# Patient Record
Sex: FEMALE | Race: ASIAN | NOT HISPANIC OR LATINO | URBAN - METROPOLITAN AREA
[De-identification: names, ages, dates, MRNs, and addresses within clinical notes are randomized per-mention and may not be internally consistent; named-entity substitution may affect disease eponyms.]

---

## 2018-02-12 ENCOUNTER — EMERGENCY (EMERGENCY)
Facility: HOSPITAL | Age: 43
LOS: 1 days | Discharge: HOME | End: 2018-02-12
Attending: EMERGENCY MEDICINE | Admitting: EMERGENCY MEDICINE
Payer: MEDICAID

## 2018-02-12 VITALS
SYSTOLIC BLOOD PRESSURE: 129 MMHG | RESPIRATION RATE: 17 BRPM | HEART RATE: 79 BPM | OXYGEN SATURATION: 100 % | TEMPERATURE: 98 F | DIASTOLIC BLOOD PRESSURE: 67 MMHG

## 2018-02-12 LAB
ALBUMIN SERPL ELPH-MCNC: 4 G/DL — SIGNIFICANT CHANGE UP (ref 3.3–5)
ALP SERPL-CCNC: 82 U/L — SIGNIFICANT CHANGE UP (ref 40–120)
ALT FLD-CCNC: 17 U/L — SIGNIFICANT CHANGE UP (ref 4–33)
AST SERPL-CCNC: 21 U/L — SIGNIFICANT CHANGE UP (ref 4–32)
BASOPHILS # BLD AUTO: 0.04 K/UL — SIGNIFICANT CHANGE UP (ref 0–0.2)
BASOPHILS NFR BLD AUTO: 0.7 % — SIGNIFICANT CHANGE UP (ref 0–2)
BILIRUB SERPL-MCNC: 0.2 MG/DL — SIGNIFICANT CHANGE UP (ref 0.2–1.2)
BUN SERPL-MCNC: 11 MG/DL — SIGNIFICANT CHANGE UP (ref 7–23)
CALCIUM SERPL-MCNC: 8.3 MG/DL — LOW (ref 8.4–10.5)
CHLORIDE SERPL-SCNC: 101 MMOL/L — SIGNIFICANT CHANGE UP (ref 98–107)
CO2 SERPL-SCNC: 26 MMOL/L — SIGNIFICANT CHANGE UP (ref 22–31)
CREAT SERPL-MCNC: 0.74 MG/DL — SIGNIFICANT CHANGE UP (ref 0.5–1.3)
EOSINOPHIL # BLD AUTO: 0.03 K/UL — SIGNIFICANT CHANGE UP (ref 0–0.5)
EOSINOPHIL NFR BLD AUTO: 0.5 % — SIGNIFICANT CHANGE UP (ref 0–6)
GLUCOSE SERPL-MCNC: 95 MG/DL — SIGNIFICANT CHANGE UP (ref 70–99)
HCT VFR BLD CALC: 32.4 % — LOW (ref 34.5–45)
HGB BLD-MCNC: 9.3 G/DL — LOW (ref 11.5–15.5)
IMM GRANULOCYTES # BLD AUTO: 0.02 # — SIGNIFICANT CHANGE UP
IMM GRANULOCYTES NFR BLD AUTO: 0.3 % — SIGNIFICANT CHANGE UP (ref 0–1.5)
LYMPHOCYTES # BLD AUTO: 1.79 K/UL — SIGNIFICANT CHANGE UP (ref 1–3.3)
LYMPHOCYTES # BLD AUTO: 30.1 % — SIGNIFICANT CHANGE UP (ref 13–44)
MCHC RBC-ENTMCNC: 20.4 PG — LOW (ref 27–34)
MCHC RBC-ENTMCNC: 28.7 % — LOW (ref 32–36)
MCV RBC AUTO: 71.2 FL — LOW (ref 80–100)
MONOCYTES # BLD AUTO: 0.43 K/UL — SIGNIFICANT CHANGE UP (ref 0–0.9)
MONOCYTES NFR BLD AUTO: 7.2 % — SIGNIFICANT CHANGE UP (ref 2–14)
NEUTROPHILS # BLD AUTO: 3.63 K/UL — SIGNIFICANT CHANGE UP (ref 1.8–7.4)
NEUTROPHILS NFR BLD AUTO: 61.2 % — SIGNIFICANT CHANGE UP (ref 43–77)
NRBC # FLD: 0 — SIGNIFICANT CHANGE UP
PLATELET # BLD AUTO: 273 K/UL — SIGNIFICANT CHANGE UP (ref 150–400)
PMV BLD: 11.5 FL — SIGNIFICANT CHANGE UP (ref 7–13)
POTASSIUM SERPL-MCNC: 4.4 MMOL/L — SIGNIFICANT CHANGE UP (ref 3.5–5.3)
POTASSIUM SERPL-SCNC: 4.4 MMOL/L — SIGNIFICANT CHANGE UP (ref 3.5–5.3)
PROT SERPL-MCNC: 8 G/DL — SIGNIFICANT CHANGE UP (ref 6–8.3)
RBC # BLD: 4.55 M/UL — SIGNIFICANT CHANGE UP (ref 3.8–5.2)
RBC # FLD: 15.1 % — HIGH (ref 10.3–14.5)
SODIUM SERPL-SCNC: 139 MMOL/L — SIGNIFICANT CHANGE UP (ref 135–145)
TSH SERPL-MCNC: 1.56 UIU/ML — SIGNIFICANT CHANGE UP (ref 0.27–4.2)
WBC # BLD: 5.94 K/UL — SIGNIFICANT CHANGE UP (ref 3.8–10.5)
WBC # FLD AUTO: 5.94 K/UL — SIGNIFICANT CHANGE UP (ref 3.8–10.5)

## 2018-02-12 PROCEDURE — 99284 EMERGENCY DEPT VISIT MOD MDM: CPT

## 2018-02-12 NOTE — ED PROVIDER NOTE - PROGRESS NOTE DETAILS
ELIAZAR Johnson:  b/l tinnitus for 6 months, intermittent cold sx.  labs pending, if ok will dc home with ENT fu. ELIAZAR Johnson:  Labs okay , found to have asymptomatic anemia.   denies bloody/dark stools, Appears to be iron deficient from previous visits, will restart her iron and set up a follow up to be seen by our IM clinic. Number provided. In addition will set up a follow up to be seen by ENT clinic, number provided as dw Dr rodriguez.

## 2018-02-12 NOTE — ED PROVIDER NOTE - CARE PLAN
Principal Discharge DX:	Tinnitus  Assessment and plan of treatment:	Follow up with your primary doctor for a post hospital visit within 48 hours, taking all results to be reviewed from the ER. Set up a follow up to be seen by an ENT for your symptoms, , taking all results from the ER to be reviewed. You can call our clinic ro set up an apponitment or use one of the physiciains on the sheet we provided. If any ear pain, fevers, cihlls, worsening, coenring or new signs or spyomts return to the ER Principal Discharge DX:	Tinnitus  Assessment and plan of treatment:	Follow up with our internal medicine clinic for a  post hospital visit within, taking all results to be reviewed from the ER. Set up a follow up to be seen by an ENT for your symptoms as well, , taking all results from the ER to be reviewed. You can call our clinic to set up an appointment for both appointments.  If f any ear pain, dizziness, headaches, fevers, chills, worsening, concerning  or new signs or symptoms return to the ER

## 2018-02-12 NOTE — ED PROVIDER NOTE - MEDICAL DECISION MAKING DETAILS
43 y/o F w/ chronic b/l tinnitus. No new medications or supplements. Subjective hearing loss. No ear pain. Nonfocal neuro exam. Plan: basic labs and ENT follow up.

## 2018-02-12 NOTE — ED PROVIDER NOTE - CHPI ED SYMPTOMS NEG
no chills/no ear pain, no HA, no dizziness, no sore throat, no congestion/no fever/no nausea/no vomiting

## 2018-02-12 NOTE — ED PROVIDER NOTE - OBJECTIVE STATEMENT
43 y/o F w/ PMHx of anemia, presents to the ED c/o constant b/l tinnitus x6 months w/ associated subjective decreased hearing. Pt describes sound in b/l ears as "shhhhh". Pt also reports intermittent cold sx, but currently asymptomatic. Endorses use of NyQuil w/ no relief. No use of Aspirin or herbal medications. Pt does not have PMD. Denies ear pain, fever, chills, rhinorrhea, congestion, sore throat, HA, dizziness, nausea, vomiting or any other complaints. NKDA.

## 2018-02-12 NOTE — ED PROVIDER NOTE - PLAN OF CARE
Follow up with your primary doctor for a post hospital visit within 48 hours, taking all results to be reviewed from the ER. Set up a follow up to be seen by an ENT for your symptoms, , taking all results from the ER to be reviewed. You can call our clinic ro set up an apponitment or use one of the physiciains on the sheet we provided. If any ear pain, fevers, cihlls, worsening, coenring or new signs or spyomts return to the ER Follow up with our internal medicine clinic for a  post hospital visit within, taking all results to be reviewed from the ER. Set up a follow up to be seen by an ENT for your symptoms as well, , taking all results from the ER to be reviewed. You can call our clinic to set up an appointment for both appointments.  If f any ear pain, dizziness, headaches, fevers, chills, worsening, concerning  or new signs or symptoms return to the ER

## 2018-02-12 NOTE — ED PROVIDER NOTE - ATTENDING CONTRIBUTION TO CARE
I performed the initial face to face bedside interview with this patient regarding history of present illness, review of symptoms and past medical, social and family history.  I completed an independent physical examination.  I was the initial provider who evaluated this patient.  The history, review of symptoms and examination was documented by the scribe in my presence and I attest to the accuracy of the documentation.  I have signed out the follow up of any pending tests (i.e. labs, radiological studies) to the PA.  I have discussed the patient’s plan of care and disposition with the PA. DHIRAJ Reynoso MD

## 2018-03-12 ENCOUNTER — EMERGENCY (EMERGENCY)
Facility: HOSPITAL | Age: 43
LOS: 1 days | Discharge: HOME | End: 2018-03-12
Attending: EMERGENCY MEDICINE | Admitting: EMERGENCY MEDICINE
Payer: MEDICAID

## 2018-03-12 VITALS
DIASTOLIC BLOOD PRESSURE: 92 MMHG | HEART RATE: 87 BPM | OXYGEN SATURATION: 100 % | RESPIRATION RATE: 16 BRPM | TEMPERATURE: 98 F | SYSTOLIC BLOOD PRESSURE: 132 MMHG

## 2018-03-12 VITALS
TEMPERATURE: 98 F | OXYGEN SATURATION: 100 % | HEART RATE: 74 BPM | DIASTOLIC BLOOD PRESSURE: 84 MMHG | SYSTOLIC BLOOD PRESSURE: 125 MMHG | RESPIRATION RATE: 18 BRPM

## 2018-03-12 LAB
ALBUMIN SERPL ELPH-MCNC: 3.9 G/DL — SIGNIFICANT CHANGE UP (ref 3.3–5)
ALP SERPL-CCNC: 86 U/L — SIGNIFICANT CHANGE UP (ref 40–120)
ALT FLD-CCNC: 21 U/L — SIGNIFICANT CHANGE UP (ref 4–33)
AST SERPL-CCNC: 21 U/L — SIGNIFICANT CHANGE UP (ref 4–32)
BASE EXCESS BLDV CALC-SCNC: 1.7 MMOL/L — SIGNIFICANT CHANGE UP
BASOPHILS # BLD AUTO: 0.02 K/UL — SIGNIFICANT CHANGE UP (ref 0–0.2)
BASOPHILS NFR BLD AUTO: 0.3 % — SIGNIFICANT CHANGE UP (ref 0–2)
BASOPHILS NFR SPEC: 1 % — SIGNIFICANT CHANGE UP (ref 0–2)
BILIRUB SERPL-MCNC: < 0.2 MG/DL — LOW (ref 0.2–1.2)
BLASTS # FLD: 0 % — SIGNIFICANT CHANGE UP (ref 0–0)
BUN SERPL-MCNC: 12 MG/DL — SIGNIFICANT CHANGE UP (ref 7–23)
CALCIUM SERPL-MCNC: 8.2 MG/DL — LOW (ref 8.4–10.5)
CHLORIDE SERPL-SCNC: 102 MMOL/L — SIGNIFICANT CHANGE UP (ref 98–107)
CO2 SERPL-SCNC: 25 MMOL/L — SIGNIFICANT CHANGE UP (ref 22–31)
CREAT SERPL-MCNC: 0.78 MG/DL — SIGNIFICANT CHANGE UP (ref 0.5–1.3)
EOSINOPHIL # BLD AUTO: 0.03 K/UL — SIGNIFICANT CHANGE UP (ref 0–0.5)
EOSINOPHIL NFR BLD AUTO: 0.5 % — SIGNIFICANT CHANGE UP (ref 0–6)
EOSINOPHIL NFR FLD: 0 % — SIGNIFICANT CHANGE UP (ref 0–6)
GAS PNL BLDV: 136 MMOL/L — SIGNIFICANT CHANGE UP (ref 136–146)
GIANT PLATELETS BLD QL SMEAR: PRESENT — SIGNIFICANT CHANGE UP
GLUCOSE BLDV-MCNC: 94 — SIGNIFICANT CHANGE UP (ref 70–99)
GLUCOSE SERPL-MCNC: 93 MG/DL — SIGNIFICANT CHANGE UP (ref 70–99)
HCO3 BLDV-SCNC: 25 MMOL/L — SIGNIFICANT CHANGE UP (ref 20–27)
HCT VFR BLD CALC: 30.2 % — LOW (ref 34.5–45)
HCT VFR BLDV CALC: 28.4 % — LOW (ref 34.5–45)
HGB BLD-MCNC: 8.8 G/DL — LOW (ref 11.5–15.5)
HGB BLDV-MCNC: 9.2 G/DL — LOW (ref 11.5–15.5)
HYPOCHROMIA BLD QL: SIGNIFICANT CHANGE UP
IMM GRANULOCYTES # BLD AUTO: 0.02 # — SIGNIFICANT CHANGE UP
IMM GRANULOCYTES NFR BLD AUTO: 0.3 % — SIGNIFICANT CHANGE UP (ref 0–1.5)
LIDOCAIN IGE QN: 32.6 U/L — SIGNIFICANT CHANGE UP (ref 7–60)
LYMPHOCYTES # BLD AUTO: 1.74 K/UL — SIGNIFICANT CHANGE UP (ref 1–3.3)
LYMPHOCYTES # BLD AUTO: 30 % — SIGNIFICANT CHANGE UP (ref 13–44)
LYMPHOCYTES NFR SPEC AUTO: 24.5 % — SIGNIFICANT CHANGE UP (ref 13–44)
MACROCYTES BLD QL: SLIGHT — SIGNIFICANT CHANGE UP
MCHC RBC-ENTMCNC: 20.2 PG — LOW (ref 27–34)
MCHC RBC-ENTMCNC: 29.1 % — LOW (ref 32–36)
MCV RBC AUTO: 69.4 FL — LOW (ref 80–100)
METAMYELOCYTES # FLD: 0 % — SIGNIFICANT CHANGE UP (ref 0–1)
MICROCYTES BLD QL: SIGNIFICANT CHANGE UP
MONOCYTES # BLD AUTO: 0.42 K/UL — SIGNIFICANT CHANGE UP (ref 0–0.9)
MONOCYTES NFR BLD AUTO: 7.2 % — SIGNIFICANT CHANGE UP (ref 2–14)
MONOCYTES NFR BLD: 1 % — LOW (ref 2–9)
MYELOCYTES NFR BLD: 0 % — SIGNIFICANT CHANGE UP (ref 0–0)
NEUTROPHIL AB SER-ACNC: 63.2 % — SIGNIFICANT CHANGE UP (ref 43–77)
NEUTROPHILS # BLD AUTO: 3.57 K/UL — SIGNIFICANT CHANGE UP (ref 1.8–7.4)
NEUTROPHILS NFR BLD AUTO: 61.7 % — SIGNIFICANT CHANGE UP (ref 43–77)
NEUTS BAND # BLD: 0 % — SIGNIFICANT CHANGE UP (ref 0–6)
NRBC # FLD: 0 — SIGNIFICANT CHANGE UP
OTHER - HEMATOLOGY %: 0 — SIGNIFICANT CHANGE UP
OVALOCYTES BLD QL SMEAR: SLIGHT — SIGNIFICANT CHANGE UP
PCO2 BLDV: 52 MMHG — HIGH (ref 41–51)
PH BLDV: 7.33 PH — SIGNIFICANT CHANGE UP (ref 7.32–7.43)
PLATELET # BLD AUTO: 253 K/UL — SIGNIFICANT CHANGE UP (ref 150–400)
PLATELET COUNT - ESTIMATE: NORMAL — SIGNIFICANT CHANGE UP
PMV BLD: 10.8 FL — SIGNIFICANT CHANGE UP (ref 7–13)
PO2 BLDV: 25 MMHG — LOW (ref 35–40)
POTASSIUM BLDV-SCNC: 4 MMOL/L — SIGNIFICANT CHANGE UP (ref 3.4–4.5)
POTASSIUM SERPL-MCNC: 4.3 MMOL/L — SIGNIFICANT CHANGE UP (ref 3.5–5.3)
POTASSIUM SERPL-SCNC: 4.3 MMOL/L — SIGNIFICANT CHANGE UP (ref 3.5–5.3)
PROMYELOCYTES # FLD: 0 % — SIGNIFICANT CHANGE UP (ref 0–0)
PROT SERPL-MCNC: 8 G/DL — SIGNIFICANT CHANGE UP (ref 6–8.3)
RBC # BLD: 4.35 M/UL — SIGNIFICANT CHANGE UP (ref 3.8–5.2)
RBC # FLD: 15.2 % — HIGH (ref 10.3–14.5)
REVIEW TO FOLLOW: YES — SIGNIFICANT CHANGE UP
SAO2 % BLDV: 30.3 % — LOW (ref 60–85)
SMUDGE CELLS # BLD: PRESENT — SIGNIFICANT CHANGE UP
SODIUM SERPL-SCNC: 138 MMOL/L — SIGNIFICANT CHANGE UP (ref 135–145)
VARIANT LYMPHS # BLD: 9.4 % — SIGNIFICANT CHANGE UP
WBC # BLD: 5.8 K/UL — SIGNIFICANT CHANGE UP (ref 3.8–10.5)
WBC # FLD AUTO: 5.8 K/UL — SIGNIFICANT CHANGE UP (ref 3.8–10.5)

## 2018-03-12 PROCEDURE — 99283 EMERGENCY DEPT VISIT LOW MDM: CPT

## 2018-03-12 RX ORDER — LIDOCAINE 4 G/100G
10 CREAM TOPICAL ONCE
Qty: 0 | Refills: 0 | Status: COMPLETED | OUTPATIENT
Start: 2018-03-12 | End: 2018-03-12

## 2018-03-12 RX ORDER — FAMOTIDINE 10 MG/ML
20 INJECTION INTRAVENOUS ONCE
Qty: 0 | Refills: 0 | Status: COMPLETED | OUTPATIENT
Start: 2018-03-12 | End: 2018-03-12

## 2018-03-12 RX ADMIN — LIDOCAINE 10 MILLILITER(S): 4 CREAM TOPICAL at 11:19

## 2018-03-12 RX ADMIN — FAMOTIDINE 20 MILLIGRAM(S): 10 INJECTION INTRAVENOUS at 11:19

## 2018-03-12 RX ADMIN — Medication 30 MILLILITER(S): at 11:19

## 2018-03-12 NOTE — ED PROVIDER NOTE - CARE PLAN
Principal Discharge DX:	Gastritis  Assessment and plan of treatment:	1) Your labs were unremarkable except for you had low red blood cell counts. Please take Pepcid for pain control and follow up with the gastroenterologist. You must also follow up with your primary care doctor within the next 3 days for your low red blood cell count.  Please call today or tomorrow for an appointment.  If you cannot follow-up with your doctor(s), please return to the ED for any urgent issues.  2) If you have any worsening of symptoms or any other concerns please return to the ED immediately.  3) Please continue taking your home medications as directed.  4) You may have been given a copy of your labs and/or imaging.  Please go over these with your primary care doctor.

## 2018-03-12 NOTE — ED PROVIDER NOTE - NS ED ROS FT
Constitutional: no fevers, chills  HEENT: no visual changes, no sore throat, no rhinorrhea  CV: no cp  Resp: no sob  GI: + abd pain, no n/v, diarrhea/constipation  : no dysuria, hematuria  MSK: no joint pains  skin: no rashes  neuro: no HA, no confusion

## 2018-03-12 NOTE — ED PROVIDER NOTE - MEDICAL DECISION MAKING DETAILS
42y F, h/o anemia, p/w generalized abd pain x1wk. Abd exam benign. No concern for surgical pathology such as SBO. Possible gastritis v PUD. Will check labs, maalox, pepcid, viscous lido, reassess. No indications for imaging at this moment.

## 2018-03-12 NOTE — ED PROVIDER NOTE - PHYSICAL EXAMINATION
Vitals: WNL  Gen: laying comfortably in NAD  Head: NCAT  ENT: sclerae white, anicterus, moist mucous membranes. No exudates. Neck supple, no LAD,  no carotid bruits, no JVD  CV: RRR. Audible S1 and S2. No murmurs, rubs, gallops, S3, nor S4, 2+ radial and DP pulses   Pulm: Clear to auscultation bilaterally. No wheezes, rales, or rhonchi  Abd: soft, normoactive BS x4, mildly tender epigastric region, no rebound, no guarding, no rashes  Musculoskeletal:  No peripheral edema  Skin: no lesions or scars noted  Neurologic: AAOx3  : no CVA tenderness

## 2018-03-12 NOTE — ED PROVIDER NOTE - OBJECTIVE STATEMENT
42y F, h/o anemia, p/w generalized abd pain x1wk. Pt also complaining of b/l breast pain. Reports pain is constant, unable to characterize type of pain, is not associated with food. Nothing makes it better or worse. Denies n/v, f/c, hematochezia, melena, vaginal discharge, abd distention. Only abd surgery was csection 12y ago. Still able to tolerate PO, has normal BM. Tried taking an herbal tea which dind't help. Last menstraul period 2wks ago. Denies redness, swelling, discharge from breasts.

## 2018-03-12 NOTE — ED PROVIDER NOTE - PROGRESS NOTE DETAILS
Minna Arguelles MD PGY1: pt improved with gi cocktail. pt with h/o anemia on iron - labs show hgb 8.8. pt without signs of bleeding, end organ damage from anemia. will d/c home with gi f/u for gastritis.

## 2018-03-12 NOTE — ED PROVIDER NOTE - PLAN OF CARE
1) Your labs were unremarkable except for you had low red blood cell counts. Please take Pepcid for pain control and follow up with the gastroenterologist. You must also follow up with your primary care doctor within the next 3 days for your low red blood cell count.  Please call today or tomorrow for an appointment.  If you cannot follow-up with your doctor(s), please return to the ED for any urgent issues.  2) If you have any worsening of symptoms or any other concerns please return to the ED immediately.  3) Please continue taking your home medications as directed.  4) You may have been given a copy of your labs and/or imaging.  Please go over these with your primary care doctor.

## 2018-03-12 NOTE — ED PROVIDER NOTE - ATTENDING CONTRIBUTION TO CARE
MD Plaza:  I performed a face to face bedside interview with patient regarding history of present illness, review of symptoms and past medical history. I completed an independent physical exam(documented below).  I have discussed patient's plan of care with resident.   I agree with note as stated above, having amended the EMR as needed to reflect my findings. I have discussed the assessment and plan of care.  This includes during the time I functioned as the attending physician for this patient.  PE:  Gen: Alert, NAD  Head: NC, AT,  EOMI, normal lids/conjunctiva  ENT:  normal hearing, patent oropharynx without erythema/exudate, uvula midline  Neck: +supple, no tenderness/meningismus/JVD, +Trachea midline  Chest: no chest wall tenderness, equal chest rise  Pulm: Bilateral BS, normal resp effort, no wheeze/stridor/retractions  CV: RRR, no M/R/G, +dist pulses  Abd: soft, ND, mild ttp in epigastrium, +BS, no hepatosplenomegaly  Rectal: deferred  Mskel: no edema/erythema/cyanosis  Skin: no rash  Neuro: AAOx  MDM:  43yo F w/ pmh of anemia here for generalized abd pain and breast pain X 1wk, no n/v, diarrhea/constipation, no fever/chills, mostly benign abd exam except for mild epigastric tenderness. H&P most consistent w/ gastritis, symptoms improved with gi cocktail here, will f/u with pmd and oupt GI.

## 2018-08-09 ENCOUNTER — EMERGENCY (EMERGENCY)
Facility: HOSPITAL | Age: 43
LOS: 1 days | Discharge: HOME | End: 2018-08-09
Attending: EMERGENCY MEDICINE | Admitting: EMERGENCY MEDICINE
Payer: MEDICAID

## 2018-08-09 VITALS
HEART RATE: 105 BPM | RESPIRATION RATE: 18 BRPM | SYSTOLIC BLOOD PRESSURE: 124 MMHG | OXYGEN SATURATION: 98 % | DIASTOLIC BLOOD PRESSURE: 72 MMHG | TEMPERATURE: 98 F

## 2018-08-09 VITALS
OXYGEN SATURATION: 100 % | SYSTOLIC BLOOD PRESSURE: 128 MMHG | DIASTOLIC BLOOD PRESSURE: 71 MMHG | HEART RATE: 67 BPM | TEMPERATURE: 98 F | RESPIRATION RATE: 16 BRPM

## 2018-08-09 LAB
ALBUMIN SERPL ELPH-MCNC: 3.9 G/DL — SIGNIFICANT CHANGE UP (ref 3.3–5)
ALP SERPL-CCNC: 79 U/L — SIGNIFICANT CHANGE UP (ref 40–120)
ALT FLD-CCNC: 12 U/L — SIGNIFICANT CHANGE UP (ref 4–33)
ANISOCYTOSIS BLD QL: SLIGHT — SIGNIFICANT CHANGE UP
APPEARANCE UR: SIGNIFICANT CHANGE UP
AST SERPL-CCNC: 18 U/L — SIGNIFICANT CHANGE UP (ref 4–32)
BASOPHILS # BLD AUTO: 0.04 K/UL — SIGNIFICANT CHANGE UP (ref 0–0.2)
BASOPHILS NFR BLD AUTO: 0.6 % — SIGNIFICANT CHANGE UP (ref 0–2)
BASOPHILS NFR SPEC: 0 % — SIGNIFICANT CHANGE UP (ref 0–2)
BILIRUB SERPL-MCNC: 0.2 MG/DL — SIGNIFICANT CHANGE UP (ref 0.2–1.2)
BILIRUB UR-MCNC: NEGATIVE — SIGNIFICANT CHANGE UP
BLASTS # FLD: 0 % — SIGNIFICANT CHANGE UP (ref 0–0)
BLOOD UR QL VISUAL: NEGATIVE — SIGNIFICANT CHANGE UP
BUN SERPL-MCNC: 10 MG/DL — SIGNIFICANT CHANGE UP (ref 7–23)
CALCIUM SERPL-MCNC: 8.6 MG/DL — SIGNIFICANT CHANGE UP (ref 8.4–10.5)
CHLORIDE SERPL-SCNC: 102 MMOL/L — SIGNIFICANT CHANGE UP (ref 98–107)
CO2 SERPL-SCNC: 26 MMOL/L — SIGNIFICANT CHANGE UP (ref 22–31)
COLOR SPEC: YELLOW — SIGNIFICANT CHANGE UP
CREAT SERPL-MCNC: 0.71 MG/DL — SIGNIFICANT CHANGE UP (ref 0.5–1.3)
EOSINOPHIL # BLD AUTO: 0.04 K/UL — SIGNIFICANT CHANGE UP (ref 0–0.5)
EOSINOPHIL NFR BLD AUTO: 0.6 % — SIGNIFICANT CHANGE UP (ref 0–6)
EOSINOPHIL NFR FLD: 2.6 % — SIGNIFICANT CHANGE UP (ref 0–6)
GIANT PLATELETS BLD QL SMEAR: PRESENT — SIGNIFICANT CHANGE UP
GLUCOSE SERPL-MCNC: 93 MG/DL — SIGNIFICANT CHANGE UP (ref 70–99)
GLUCOSE UR-MCNC: NEGATIVE — SIGNIFICANT CHANGE UP
HCT VFR BLD CALC: 27.5 % — LOW (ref 34.5–45)
HGB BLD-MCNC: 7.8 G/DL — LOW (ref 11.5–15.5)
HYPOCHROMIA BLD QL: SIGNIFICANT CHANGE UP
IMM GRANULOCYTES # BLD AUTO: 0.01 # — SIGNIFICANT CHANGE UP
IMM GRANULOCYTES NFR BLD AUTO: 0.1 % — SIGNIFICANT CHANGE UP (ref 0–1.5)
KETONES UR-MCNC: NEGATIVE — SIGNIFICANT CHANGE UP
LEUKOCYTE ESTERASE UR-ACNC: NEGATIVE — SIGNIFICANT CHANGE UP
LIDOCAIN IGE QN: 32.1 U/L — SIGNIFICANT CHANGE UP (ref 7–60)
LYMPHOCYTES # BLD AUTO: 2.05 K/UL — SIGNIFICANT CHANGE UP (ref 1–3.3)
LYMPHOCYTES # BLD AUTO: 30.5 % — SIGNIFICANT CHANGE UP (ref 13–44)
LYMPHOCYTES NFR SPEC AUTO: 33.1 % — SIGNIFICANT CHANGE UP (ref 13–44)
MCHC RBC-ENTMCNC: 18.4 PG — LOW (ref 27–34)
MCHC RBC-ENTMCNC: 28.4 % — LOW (ref 32–36)
MCV RBC AUTO: 64.7 FL — LOW (ref 80–100)
METAMYELOCYTES # FLD: 0 % — SIGNIFICANT CHANGE UP (ref 0–1)
MICROCYTES BLD QL: SIGNIFICANT CHANGE UP
MONOCYTES # BLD AUTO: 0.46 K/UL — SIGNIFICANT CHANGE UP (ref 0–0.9)
MONOCYTES NFR BLD AUTO: 6.8 % — SIGNIFICANT CHANGE UP (ref 2–14)
MONOCYTES NFR BLD: 1.7 % — LOW (ref 2–9)
MUCOUS THREADS # UR AUTO: SIGNIFICANT CHANGE UP
MYELOCYTES NFR BLD: 0 % — SIGNIFICANT CHANGE UP (ref 0–0)
NEUTROPHIL AB SER-ACNC: 60 % — SIGNIFICANT CHANGE UP (ref 43–77)
NEUTROPHILS # BLD AUTO: 4.13 K/UL — SIGNIFICANT CHANGE UP (ref 1.8–7.4)
NEUTROPHILS NFR BLD AUTO: 61.4 % — SIGNIFICANT CHANGE UP (ref 43–77)
NEUTS BAND # BLD: 0 % — SIGNIFICANT CHANGE UP (ref 0–6)
NITRITE UR-MCNC: NEGATIVE — SIGNIFICANT CHANGE UP
NON-SQ EPI CELLS # UR AUTO: 1 — SIGNIFICANT CHANGE UP
NRBC # FLD: 0 — SIGNIFICANT CHANGE UP
OTHER - HEMATOLOGY %: 0 — SIGNIFICANT CHANGE UP
OVALOCYTES BLD QL SMEAR: SIGNIFICANT CHANGE UP
PH UR: 6 — SIGNIFICANT CHANGE UP (ref 4.6–8)
PLATELET # BLD AUTO: 290 K/UL — SIGNIFICANT CHANGE UP (ref 150–400)
PLATELET COUNT - ESTIMATE: NORMAL — SIGNIFICANT CHANGE UP
PMV BLD: 11.1 FL — SIGNIFICANT CHANGE UP (ref 7–13)
POIKILOCYTOSIS BLD QL AUTO: SLIGHT — SIGNIFICANT CHANGE UP
POTASSIUM SERPL-MCNC: 4.1 MMOL/L — SIGNIFICANT CHANGE UP (ref 3.5–5.3)
POTASSIUM SERPL-SCNC: 4.1 MMOL/L — SIGNIFICANT CHANGE UP (ref 3.5–5.3)
PROMYELOCYTES # FLD: 0 % — SIGNIFICANT CHANGE UP (ref 0–0)
PROT SERPL-MCNC: 8.3 G/DL — SIGNIFICANT CHANGE UP (ref 6–8.3)
PROT UR-MCNC: 10 MG/DL — SIGNIFICANT CHANGE UP
RBC # BLD: 4.25 M/UL — SIGNIFICANT CHANGE UP (ref 3.8–5.2)
RBC # FLD: 17.5 % — HIGH (ref 10.3–14.5)
RBC CASTS # UR COMP ASSIST: SIGNIFICANT CHANGE UP (ref 0–?)
SODIUM SERPL-SCNC: 137 MMOL/L — SIGNIFICANT CHANGE UP (ref 135–145)
SP GR SPEC: 1.02 — SIGNIFICANT CHANGE UP (ref 1–1.04)
SQUAMOUS # UR AUTO: SIGNIFICANT CHANGE UP
UROBILINOGEN FLD QL: NORMAL MG/DL — SIGNIFICANT CHANGE UP
VARIANT LYMPHS # BLD: 2.6 % — SIGNIFICANT CHANGE UP
WBC # BLD: 6.73 K/UL — SIGNIFICANT CHANGE UP (ref 3.8–10.5)
WBC # FLD AUTO: 6.73 K/UL — SIGNIFICANT CHANGE UP (ref 3.8–10.5)
WBC UR QL: SIGNIFICANT CHANGE UP (ref 0–?)

## 2018-08-09 PROCEDURE — 99283 EMERGENCY DEPT VISIT LOW MDM: CPT

## 2018-08-09 PROCEDURE — 76705 ECHO EXAM OF ABDOMEN: CPT | Mod: 26

## 2018-08-09 PROCEDURE — 71046 X-RAY EXAM CHEST 2 VIEWS: CPT | Mod: 26

## 2018-08-09 RX ORDER — DOCUSATE SODIUM 100 MG
1 CAPSULE ORAL
Qty: 60 | Refills: 0 | OUTPATIENT
Start: 2018-08-09

## 2018-08-09 RX ORDER — ACETAMINOPHEN 500 MG
650 TABLET ORAL ONCE
Qty: 0 | Refills: 0 | Status: COMPLETED | OUTPATIENT
Start: 2018-08-09 | End: 2018-08-09

## 2018-08-09 RX ORDER — FERROUS SULFATE 325(65) MG
1 TABLET ORAL
Qty: 60 | Refills: 0 | OUTPATIENT
Start: 2018-08-09

## 2018-08-09 RX ADMIN — Medication 650 MILLIGRAM(S): at 14:24

## 2018-08-09 NOTE — ED PROVIDER NOTE - PHYSICAL EXAMINATION
GEN - NAD; well appearing; A+O x3   HEAD - NC/AT     ENT - =EOMI, mucous membranes  moist , no discharge, no scleral icterus  NECK: Neck supple  PULM - CTA b/l,  symmetric breath sounds. +tenderness over right lateral and posterior lower chest wall, no rash or contusion or crepitus  COR -  normal heart sounds    ABD - , ND, NT, soft, no guarding, no rebound, no masses.  BACK - no CVA tenderness, nontender spine     EXTREMS - no edema, no deformity, warm and well perfused    SKIN - no rash or bruising      NEUROLOGIC - alert, CN 2-12 intact, sensation nl, motor 5/5 RUE/LUE/RLE/LLE.

## 2018-08-09 NOTE — PROVIDER CONTACT NOTE (OTHER) - ASSESSMENT
SW received call from provider [phone] requesting information regarding pt's insurance as pt will require outpt follow up. SW verified pt's Medicaid and Emergency Medicaid only. SW informed team and educated regarding resources and options for outpt follow up. No further SW needs identified.

## 2018-08-09 NOTE — ED PROVIDER NOTE - PROGRESS NOTE DETAILS
Pt anemic to 7.8. Denies black stools, vaginal bleeding, blood thinner use. 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 pt has no dizziness, cp, sob or tachycardia. Will start on iron and send for primary care f/u. Pt has emergency medicaid only, spoke to JESSICA and best for pt to f/u in [facility] for now or pay sliding scale in [facility]. Pt still pending CXR. prelim read on CXR is negative. WIll d/c home.

## 2018-08-09 NOTE — ED PROVIDER NOTE - OBJECTIVE STATEMENT
43F with no significant pmh here with RUQ and R chest wall pain x 5 days. Pt not sure if related to food intake. +Urinary frequency. Denies f/c, injury, n/v, d/c, cp, sob, cough, prior hx of gallstones.

## 2018-08-09 NOTE — ED PROVIDER NOTE - MEDICAL DECISION MAKING DETAILS
possibly MSK pain. will r/o underlying GB disease, pancreatitis, pna, ptx.  plan for labs, POCT thoracic US,  GB US, pain control. Given urinary frequency, will check UA.

## 2018-08-27 ENCOUNTER — EMERGENCY (EMERGENCY)
Facility: HOSPITAL | Age: 43
LOS: 1 days | Discharge: HOME | End: 2018-08-27
Attending: EMERGENCY MEDICINE | Admitting: EMERGENCY MEDICINE
Payer: MEDICAID

## 2018-08-27 VITALS
RESPIRATION RATE: 16 BRPM | HEART RATE: 90 BPM | DIASTOLIC BLOOD PRESSURE: 98 MMHG | OXYGEN SATURATION: 97 % | SYSTOLIC BLOOD PRESSURE: 133 MMHG | TEMPERATURE: 98 F

## 2018-08-27 PROCEDURE — 99283 EMERGENCY DEPT VISIT LOW MDM: CPT

## 2018-08-27 RX ORDER — AMOXICILLIN 250 MG/5ML
500 SUSPENSION, RECONSTITUTED, ORAL (ML) ORAL ONCE
Qty: 0 | Refills: 0 | Status: COMPLETED | OUTPATIENT
Start: 2018-08-27 | End: 2018-08-27

## 2018-08-27 RX ORDER — AMOXICILLIN 250 MG/5ML
1 SUSPENSION, RECONSTITUTED, ORAL (ML) ORAL
Qty: 21 | Refills: 0 | OUTPATIENT
Start: 2018-08-27 | End: 2018-09-02

## 2018-08-27 RX ORDER — IBUPROFEN 200 MG
600 TABLET ORAL ONCE
Qty: 0 | Refills: 0 | Status: COMPLETED | OUTPATIENT
Start: 2018-08-27 | End: 2018-08-27

## 2018-08-27 RX ADMIN — Medication 500 MILLIGRAM(S): at 13:25

## 2018-08-27 RX ADMIN — Medication 600 MILLIGRAM(S): at 13:25

## 2018-08-27 NOTE — ED PROVIDER NOTE - OBJECTIVE STATEMENT
43y F with no PMHx presents to the ED for bilateral lower dental pain. Has right lower dental pain x1 week and left lower dental pain for few days. States right side has more pain than left. Does endorse swelling to face. Taking tylenol and motrin last dosage 12AM. No fevers. LMP 1 week ago and nml

## 2018-08-27 NOTE — ED PROVIDER NOTE - CARE PLAN
Principal Discharge DX:	Tooth pain  Assessment and plan of treatment:	Take 3 advil every 8 hours as needed for pain.  Take amoxicillin 500mg every 8 hours for 7 days.  Follow up with dental clinic-call [phone] for an appointment.  Return to ED for any worsening swelling or fever.

## 2018-08-27 NOTE — PROGRESS NOTE ADULT - SUBJECTIVE AND OBJECTIVE BOX
Patient is a 43y old  Female who presents with a chief complaint of pain in lower left and lower right    HPI: Patient has been in pain for one week prior to today's visit to ED. Patient does not have an outpatient dentist.      PAST MEDICAL & SURGICAL HISTORY:  No pertinent past medical history  Anemia  No significant past surgical history  S/P     MEDICATIONS  (STANDING):    MEDICATIONS  (PRN):      Allergies    No Known Allergies    Intolerances        FAMILY HISTORY:  No significant family history      *SOCIAL HISTORY: (guardian or who pt came with), (smoking hx)    *Last Dental Visit:    Vital Signs Last 24 Hrs  T(C): 36.7 (27 Aug 2018 11:39), Max: 36.7 (27 Aug 2018 11:39)  T(F): 98.1 (27 Aug 2018 11:39), Max: 98.1 (27 Aug 2018 11:39)  HR: 90 (27 Aug 2018 11:39) (90 - 90)  BP: 133/98 (27 Aug 2018 11:39) (133/98 - 133/98)  BP(mean): --  RR: 16 (27 Aug 2018 11:39) (16 - 16)  SpO2: 97% (27 Aug 2018 11:39) (97% - 97%)    EOE:  TMJ ( -  ) clicks                    ( -   ) pops                    (  -  ) crepitus             Mandible             Facial bones and MOM             ( +  ) trismus. Patient has difficulty opening due to pain             ( -  ) LAD             ( -  ) swelling. Patient reports swelling but no area of fluctuance observed. Border of mandible palpable.             ( -  ) asymmetry             ( +  ) palpation. Tender in lower right             ( -  ) SOB             ( -  ) dysphagia             ( -  ) LOC    IOE:  Permanent dentition with multiple restorations and generalized decay           tongue/FOM WNL           labial/buccal mucosa            ( +  ) percussion           ( +  ) palpation           ( -  ) swelling     Patient is sensitive to percussion and palpation on teeth #30 and 31. No swelling observed.     Radiographs: Diagnostic periapical radiograph could not obtained due to patient's pain tolerance. Panoramic radiograph reveals extensive decay (especially tooth #17) and generalized advanced bone loss      ASSESSMENT: Patient requires comprehensive dental care which would include dental extractions and periodontal treatment.    PROCEDURE:  Extraoral and intraoral examination completed with panoramic radiographs. Advised patient to seek outpatient dental care for comprehensive treatment.     RECOMMENDATIONS:   1) Antibiotics and pain medication as per ED  2) Dental F/U with outpatient dentist for comprehensive dental care.   3) If any oral swelling occurs or difficulty breathing arises, return to ED    Kb Saleem, pager #[phone]

## 2018-08-27 NOTE — ED PROVIDER NOTE - PLAN OF CARE
Take 3 advil every 8 hours as needed for pain.  Take amoxicillin 500mg every 8 hours for 7 days.  Follow up with dental clinic-call [phone] for an appointment.  Return to ED for any worsening swelling or fever.

## 2018-08-27 NOTE — ED PROVIDER NOTE - MEDICAL DECISION MAKING DETAILS
43y F with right lower tooth pain associated with swelling and overall poor dentition. At risk for dental abscess. Consult dental for evaluation and give pain medications

## 2018-08-27 NOTE — ED PROVIDER NOTE - ENMT, MLM
Airway patent, Nasal mucosa clear. Mouth with normal mucosa. Throat has no vesicles, no oropharyngeal exudates and uvula is midline. +bilateral cervical SERENA more tender on right. Back right lower molar TTP and little bit of gum swelling at base. Poor dentition

## 2019-05-09 NOTE — ED PROVIDER NOTE - PROGRESS NOTE DETAILS
Soft, nontender ELIAZAR Trinh: Received sign out from Dr. Mora. Pt seen by dental, recommended amoxicillin prophylactically and dental work done outpatient.  Pt has not insurance, will give several clinic numbers.

## 2020-05-20 NOTE — ED ADULT NURSE NOTE - ATTEMPT TO OOB
Last office visit 1/14/2020, last CBC 4/7/2020, last CMP 11/26/19-refilled per standing orders.   
no

## 2020-08-22 ENCOUNTER — EMERGENCY (EMERGENCY)
Facility: HOSPITAL | Age: 45
LOS: 1 days | Discharge: HOME | End: 2020-08-22
Attending: EMERGENCY MEDICINE | Admitting: EMERGENCY MEDICINE
Payer: MEDICAID

## 2020-08-22 VITALS
RESPIRATION RATE: 16 BRPM | SYSTOLIC BLOOD PRESSURE: 124 MMHG | TEMPERATURE: 98 F | DIASTOLIC BLOOD PRESSURE: 65 MMHG | HEART RATE: 94 BPM | OXYGEN SATURATION: 100 %

## 2020-08-22 VITALS
HEART RATE: 82 BPM | RESPIRATION RATE: 18 BRPM | DIASTOLIC BLOOD PRESSURE: 68 MMHG | OXYGEN SATURATION: 100 % | SYSTOLIC BLOOD PRESSURE: 129 MMHG

## 2020-08-22 LAB
ANION GAP SERPL CALC-SCNC: 13 MMO/L — SIGNIFICANT CHANGE UP (ref 7–14)
BASOPHILS # BLD AUTO: 0.03 K/UL — SIGNIFICANT CHANGE UP (ref 0–0.2)
BASOPHILS NFR BLD AUTO: 0.4 % — SIGNIFICANT CHANGE UP (ref 0–2)
BUN SERPL-MCNC: 11 MG/DL — SIGNIFICANT CHANGE UP (ref 7–23)
CALCIUM SERPL-MCNC: 8.7 MG/DL — SIGNIFICANT CHANGE UP (ref 8.4–10.5)
CHLORIDE SERPL-SCNC: 106 MMOL/L — SIGNIFICANT CHANGE UP (ref 98–107)
CO2 SERPL-SCNC: 22 MMOL/L — SIGNIFICANT CHANGE UP (ref 22–31)
CREAT SERPL-MCNC: 0.71 MG/DL — SIGNIFICANT CHANGE UP (ref 0.5–1.3)
EOSINOPHIL # BLD AUTO: 0.05 K/UL — SIGNIFICANT CHANGE UP (ref 0–0.5)
EOSINOPHIL NFR BLD AUTO: 0.6 % — SIGNIFICANT CHANGE UP (ref 0–6)
GLUCOSE SERPL-MCNC: 113 MG/DL — HIGH (ref 70–99)
HCT VFR BLD CALC: 33.1 % — LOW (ref 34.5–45)
HGB BLD-MCNC: 10.1 G/DL — LOW (ref 11.5–15.5)
IMM GRANULOCYTES NFR BLD AUTO: 0.4 % — SIGNIFICANT CHANGE UP (ref 0–1.5)
LYMPHOCYTES # BLD AUTO: 1.78 K/UL — SIGNIFICANT CHANGE UP (ref 1–3.3)
LYMPHOCYTES # BLD AUTO: 23.1 % — SIGNIFICANT CHANGE UP (ref 13–44)
MCHC RBC-ENTMCNC: 21.4 PG — LOW (ref 27–34)
MCHC RBC-ENTMCNC: 30.5 % — LOW (ref 32–36)
MCV RBC AUTO: 70.3 FL — LOW (ref 80–100)
MONOCYTES # BLD AUTO: 0.46 K/UL — SIGNIFICANT CHANGE UP (ref 0–0.9)
MONOCYTES NFR BLD AUTO: 6 % — SIGNIFICANT CHANGE UP (ref 2–14)
NEUTROPHILS # BLD AUTO: 5.35 K/UL — SIGNIFICANT CHANGE UP (ref 1.8–7.4)
NEUTROPHILS NFR BLD AUTO: 69.5 % — SIGNIFICANT CHANGE UP (ref 43–77)
NRBC # FLD: 0 K/UL — SIGNIFICANT CHANGE UP (ref 0–0)
PLATELET # BLD AUTO: 262 K/UL — SIGNIFICANT CHANGE UP (ref 150–400)
PMV BLD: 10.8 FL — SIGNIFICANT CHANGE UP (ref 7–13)
POTASSIUM SERPL-MCNC: 3.9 MMOL/L — SIGNIFICANT CHANGE UP (ref 3.5–5.3)
POTASSIUM SERPL-SCNC: 3.9 MMOL/L — SIGNIFICANT CHANGE UP (ref 3.5–5.3)
RBC # BLD: 4.71 M/UL — SIGNIFICANT CHANGE UP (ref 3.8–5.2)
RBC # FLD: 15.8 % — HIGH (ref 10.3–14.5)
SODIUM SERPL-SCNC: 141 MMOL/L — SIGNIFICANT CHANGE UP (ref 135–145)
WBC # BLD: 7.7 K/UL — SIGNIFICANT CHANGE UP (ref 3.8–10.5)
WBC # FLD AUTO: 7.7 K/UL — SIGNIFICANT CHANGE UP (ref 3.8–10.5)

## 2020-08-22 PROCEDURE — 99284 EMERGENCY DEPT VISIT MOD MDM: CPT

## 2020-08-22 RX ORDER — METOCLOPRAMIDE HCL 10 MG
10 TABLET ORAL ONCE
Refills: 0 | Status: COMPLETED | OUTPATIENT
Start: 2020-08-22 | End: 2020-08-22

## 2020-08-22 RX ORDER — SODIUM CHLORIDE 9 MG/ML
1000 INJECTION INTRAMUSCULAR; INTRAVENOUS; SUBCUTANEOUS ONCE
Refills: 0 | Status: COMPLETED | OUTPATIENT
Start: 2020-08-22 | End: 2020-08-22

## 2020-08-22 RX ORDER — DIPHENHYDRAMINE HCL 50 MG
50 CAPSULE ORAL ONCE
Refills: 0 | Status: COMPLETED | OUTPATIENT
Start: 2020-08-22 | End: 2020-08-22

## 2020-08-22 RX ORDER — KETOROLAC TROMETHAMINE 30 MG/ML
30 SYRINGE (ML) INJECTION ONCE
Refills: 0 | Status: DISCONTINUED | OUTPATIENT
Start: 2020-08-22 | End: 2020-08-22

## 2020-08-22 RX ADMIN — SODIUM CHLORIDE 1000 MILLILITER(S): 9 INJECTION INTRAMUSCULAR; INTRAVENOUS; SUBCUTANEOUS at 13:08

## 2020-08-22 RX ADMIN — Medication 30 MILLIGRAM(S): at 13:09

## 2020-08-22 RX ADMIN — Medication 50 MILLIGRAM(S): at 13:30

## 2020-08-22 RX ADMIN — Medication 10 MILLIGRAM(S): at 13:09

## 2020-08-22 NOTE — ED PROVIDER NOTE - NSFOLLOWUPINSTRUCTIONS_ED_ALL_ED_FT
Follow up with your primary care doctor within 1 week  Follow up with a neurologist within 1 week, referral list provided, [facility] information listed above  Take Motrin 600mg every 8 hours as needed for pain, take with you  You can also take Tylenol 650mg every 6 hours for pain  Return to the ER with any worsening or concerning symptoms, increased pain, vomiting, visual changes or any other concerns.

## 2020-08-22 NOTE — ED PROVIDER NOTE - OBJECTIVE STATEMENT
44 y/o F hx anemia c/o headache since yesterday gradual onset. Aching in nature primarily on right side of head occasionally extends to right jaw and right side of neck. Otherwise denies fever, vision changes, neck stiffness, numbness, tingling, weakness, balance problems. Taking Tylenol with good effect.

## 2020-08-22 NOTE — ED ADULT NURSE REASSESSMENT NOTE - GENERAL PATIENT STATE
pt anxious, jumpy insists on leaving. Episode started shortly after given reglan. Dr Ryan aware, vs as documented
resting/sleeping/comfortable appearance

## 2020-08-22 NOTE — ED PROVIDER NOTE - PATIENT PORTAL LINK FT
You can access the FollowMyHealth Patient Portal offered by [facility] by registering at the following website: [email or web address]. By joining [employer]’s FollowMyHealth portal, you will also be able to view your health information using other applications (apps) compatible with our system.

## 2020-08-22 NOTE — ED PROVIDER NOTE - PROGRESS NOTE DETAILS
MD CHO:  Akathisia rxn to reglan.  Tx with benadryl.  Reassess. ELIAZAR Kaur: Discussed pt care with , labs show anemia. Pt reports improvement in headache. Will d/c home with neuro follow up and [facility] headache center information

## 2020-08-22 NOTE — ED PROVIDER NOTE - CLINICAL SUMMARY MEDICAL DECISION MAKING FREE TEXT BOX
44 y/o F hx of anemia presenting with headache x2 days. No red flags for headache likely primary in origin. Consider anemia. Obtain CBC, BMP, IV fluids, Toradol, Reglan and reassess. Anticipate discharge with headache center follow up for outpatient.

## 2020-08-22 NOTE — ED ADULT NURSE NOTE - NSIMPLEMENTINTERV_GEN_ALL_ED
Implemented All Universal Safety Interventions:  [place] to call system. Call bell, personal items and telephone within reach. Instruct patient to call for assistance. Room bathroom lighting operational. Non-slip footwear when patient is off stretcher. Physically safe environment: no spills, clutter or unnecessary equipment. Stretcher in lowest position, wheels locked, appropriate side rails in place.

## 2020-08-22 NOTE — ED ADULT NURSE NOTE - OBJECTIVE STATEMENT
pt received to intake 10C with co headache. states it happens in different spots. denies trauma & dizziness. states pain currently 8/10. sl placed labs sent. medicated as ordered.

## 2020-08-22 NOTE — ED PROVIDER NOTE - ENMT, MLM
Airway patent, Nasal mucosa clear. Mouth with normal mucosa. Throat has no vesicles, no oropharyngeal exudates and uvula is midline. Supple neck.

## 2020-08-22 NOTE — ED PROVIDER NOTE - CARE PROVIDER_API CALL
Kaushik Melgoza  NEUROLOGY  [address]  [place] [address]  Phone: [phone]  Fax: [phone]  Follow Up Time:

## 2022-09-23 NOTE — ED ADULT TRIAGE NOTE - TEMPERATURE IN FAHRENHEIT (DEGREES F)
98 Metronidazole Counseling:  I discussed with the patient the risks of metronidazole including but not limited to seizures, nausea/vomiting, a metallic taste in the mouth, nausea/vomiting and severe allergy.

## 2022-11-10 NOTE — ED ADULT TRIAGE NOTE - MEANS OF ARRIVAL
Called patient and let her know that she should call Dr. Sam Manual office for referral as she is s/p CRYSTAL flap. She is comfortable with plan.
Patient called wanting to know if Dr. Bing Patel can recommend/send referral for PT. Had mastectomies 8/2 and CRYSTAL flap recon on 10/25.  Will reach out to Dr. Bing Patel for possible referral.
ambulatory

## 2023-07-31 NOTE — ED ADULT NURSE NOTE - NS ED NOTE  TALK SOMEONE YN
Refill Request - Controlled Substance    CONFIRM preferrred pharmacy with the patient. If Mail Order Rx - Pend for 90 day refill. Last Seen Department: 4/26/2023  Last Seen by PCP: 4/26/2023    Last Written: 11-2-2033    Last UDS: 6-6-2023    Med Agreement Signed On: ? If no future appointment scheduled, route STAFF MESSAGE with patient name to the [facility] for scheduling. CONFIRM preferrred pharmacy with the patient. Next Appointment:   Future Appointments   Date Time Provider [address]   8/22/2023  4:20 PM MD Dell Ardon [place] - DYD   10/4/2023  3:00 PM Prudy Alt, APRN - CNP [facility] AND [facility] [place]   11/1/2023  1:20 PM MD Dlel Ardon  [place] - DYD   11/28/2023  2:00 PM MD Reid Ray [facility]       Message sent to [facility] to schedule appt with patient?   N/A      Requested Prescriptions     Pending Prescriptions Disp Refills    gabapentin (NEURONTIN) 100 MG capsule [Pharmacy Med Name: GABAPENTIN 100 MG CAPSULE] 180 capsule 5     Sig: TAKE TWO CAPSULES BY MOUTH THREE TIMES A DAY AS DIRECTED No

## 2024-12-19 NOTE — ED PROVIDER NOTE - CPE EDP [facility] NORM
[de-identified] : Chief Complaint: Right-sided neck pain   History of Present Illness: 60-year-old male presenting today with complaints of right-sided neck pain.  I saw him as a 1 year ago and he was diagnosed with right elbow lateral epicondylitis he responded well to the injection.  He is coming in now complaining of neck pain mainly right-sided paraspinal musculature aching in nature and it will wake him up at nighttime.  He has difficulty with range of motion of his neck.  He denies any radiation of pain down to his arms or shoulders.  Is a numbness and tingling.  Rates the pain as a 5 out of 10 in severity worse with activity improved with rest   Past medical history, past surgical history, medications, allergies, social history, and family history are as documented in our records today.  Notable items include: []   Review of Systems: I have reviewed the patient's documented Review of Systems data today, I concur with this documentation. 
- - -

## 2025-07-01 NOTE — ED ADULT TRIAGE NOTE - HEART RATE (BEATS/MIN)
[MA] : MA [Appropriately responsive] : appropriately responsive [Alert] : alert [No Acute Distress] : no acute distress [No Lymphadenopathy] : no lymphadenopathy [Regular Rate Rhythm] : regular rate rhythm [No Murmurs] : no murmurs 105 [Clear to Auscultation B/L] : clear to auscultation bilaterally [Soft] : soft [Non-tender] : non-tender [Non-distended] : non-distended [No HSM] : No HSM [No Lesions] : no lesions [No Mass] : no mass [Oriented x3] : oriented x3 [Labia Majora] : normal [Labia Minora] : normal [Scant] : There was scant vaginal bleeding [Normal] : normal [Enlarged ___ wks] : enlarged [unfilled] ~Uweeks [FreeTextEntry2] : Susan